# Patient Record
(demographics unavailable — no encounter records)

---

## 2025-01-23 NOTE — HEALTH RISK ASSESSMENT
[Yes] : Yes [Monthly or less (1 pt)] : Monthly or less (1 point) [1 or 2 (0 pts)] : 1 or 2 (0 points) [Never (0 pts)] : Never (0 points) [No] : In the past 12 months have you used drugs other than those required for medical reasons? No [0] : 2) Feeling down, depressed, or hopeless: Not at all (0) [PHQ-2 Negative - No further assessment needed] : PHQ-2 Negative - No further assessment needed [HIV Test offered] : HIV Test offered [Hepatitis C test offered] : Hepatitis C test offered [None] : None [Alone] : lives alone [Employed] : employed [Feels Safe at Home] : Feels safe at home [Significant Other] : lives with significant other [Fully functional (bathing, dressing, toileting, transferring, walking, feeding)] : Fully functional (bathing, dressing, toileting, transferring, walking, feeding) [Fully functional (using the telephone, shopping, preparing meals, housekeeping, doing laundry, using] : Fully functional and needs no help or supervision to perform IADLs (using the telephone, shopping, preparing meals, housekeeping, doing laundry, using transportation, managing medications and managing finances) [Smoke Detector] : smoke detector [Carbon Monoxide Detector] : carbon monoxide detector [Never] : Never [NO] : No [Audit-CScore] : 1 [[id number]] : 0 [Change in mental status noted] : No change in mental status noted [Language] : denies difficulty with language [Handling Complex Tasks] : denies difficulty handling complex tasks [Reports changes in hearing] : Reports no changes in hearing [Reports changes in vision] : Reports no changes in vision [Reports changes in dental health] : Reports no changes in dental health [MammogramDate] : 04/24 [PapSmearDate] : due [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

## 2025-01-23 NOTE — HISTORY OF PRESENT ILLNESS
[FreeTextEntry1] : establish care [de-identified] : 43 yo F with no sig pmhx presents to establish care and CPE Feels well today and denies all complaints

## 2025-01-23 NOTE — PLAN
[FreeTextEntry1] : Health Care Maintenance - routine labs, follow up results -- bloodwork performed in office - STD screening today - per patient, UTD influenza and TDAP vaccine 2018 - Mammo April 2024, script for repeat given for April 2025  - Pap due-- GYN appointment today - depression screen negative - recommend annual skin cancer screening with Dermatologist - recommended annual eye exam with Ophthalmologist - recommended annual dental exam - no sig pmhx - continue lifestyle modifications - CPE in 1 year or sooner visit as needed   Cold sores  - requestign valtrex renewal

## 2025-01-24 NOTE — HISTORY OF PRESENT ILLNESS
[FreeTextEntry1] :  42 year old G0 female presents for annual. Last GYN visit was in December 2023. Had Mirena IUD inserted in December 2023. Reports light menses with IUD. H/o abnormal pap, normal colpo in 2022. 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

## 2025-01-24 NOTE — END OF VISIT
[FreeTextEntry3] :  I, Kelly Pelayo, acted as a scribe on behalf of Dr. Cathryn Nieves M.D. on 01/23/2025.   All medical entries made by the scribe were at my, Dr. Cathryn Nieves M.D., direction and personally dictated by me on 01/23/2025. I have reviewed the chart and agree that the record accurately reflects my personal performance of the history, physical exam, assessment and plan. I have also personally directed, reviewed, and agreed with the chart.

## 2025-01-24 NOTE — HISTORY OF PRESENT ILLNESS
[FreeTextEntry1] :  42 year old G0 female presents for annual. Last GYN visit was in December 2023. Had Mirena IUD inserted in December 2023. Reports light menses with IUD. H/o abnormal pap, normal colpo in 2022. 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

## 2025-01-24 NOTE — PLAN
[FreeTextEntry1] :  42 year old G0 female presents for annual.  -Pap/HPV -Continue Mirena -Advised mammo (Rx given by PCP)  F/u in 1 yr.